# Patient Record
Sex: FEMALE | Race: WHITE | NOT HISPANIC OR LATINO
[De-identification: names, ages, dates, MRNs, and addresses within clinical notes are randomized per-mention and may not be internally consistent; named-entity substitution may affect disease eponyms.]

---

## 2018-05-12 ENCOUNTER — TRANSCRIPTION ENCOUNTER (OUTPATIENT)
Age: 63
End: 2018-05-12

## 2023-03-15 ENCOUNTER — OFFICE (OUTPATIENT)
Dept: URBAN - METROPOLITAN AREA CLINIC 97 | Facility: CLINIC | Age: 68
Setting detail: OPHTHALMOLOGY
End: 2023-03-15
Payer: COMMERCIAL

## 2023-03-15 DIAGNOSIS — H01.001: ICD-10-CM

## 2023-03-15 DIAGNOSIS — H01.005: ICD-10-CM

## 2023-03-15 DIAGNOSIS — H01.004: ICD-10-CM

## 2023-03-15 DIAGNOSIS — H40.033: ICD-10-CM

## 2023-03-15 DIAGNOSIS — H01.002: ICD-10-CM

## 2023-03-15 DIAGNOSIS — H25.13: ICD-10-CM

## 2023-03-15 DIAGNOSIS — D31.02: ICD-10-CM

## 2023-03-15 DIAGNOSIS — D31.01: ICD-10-CM

## 2023-03-15 PROCEDURE — 92014 COMPRE OPH EXAM EST PT 1/>: CPT | Performed by: OPHTHALMOLOGY

## 2023-03-15 PROCEDURE — 92020 GONIOSCOPY: CPT | Performed by: OPHTHALMOLOGY

## 2023-03-15 ASSESSMENT — REFRACTION_CURRENTRX
OS_CYLINDER: +0.25
OD_SPHERE: -3.50
OD_OVR_VA: 20/
OD_VPRISM_DIRECTION: PROGS
OS_ADD: +2.50
OS_VPRISM_DIRECTION: PROGS
OD_ADD: +2.50
OS_SPHERE: -3.00
OS_OVR_VA: 20/
OD_CYLINDER: +0.75
OS_AXIS: 143
OD_AXIS: 013

## 2023-03-15 ASSESSMENT — TONOMETRY
OS_IOP_MMHG: 15
OD_IOP_MMHG: 14

## 2023-03-15 ASSESSMENT — KERATOMETRY
OS_AXISANGLE_DEGREES: 084
OD_K2POWER_DIOPTERS: 46.25
OS_K1POWER_DIOPTERS: 45.50
OS_K2POWER_DIOPTERS: 46.25
OD_AXISANGLE_DEGREES: 072
OD_K1POWER_DIOPTERS: 46.00

## 2023-03-15 ASSESSMENT — REFRACTION_AUTOREFRACTION
OS_AXIS: 129
OD_AXIS: 022
OD_SPHERE: -3.75
OS_CYLINDER: +0.25
OS_SPHERE: -3.00
OD_CYLINDER: +0.75

## 2023-03-15 ASSESSMENT — AXIALLENGTH_DERIVED
OD_AL: 23.9479
OS_AL: 23.8423

## 2023-03-15 ASSESSMENT — SPHEQUIV_DERIVED
OS_SPHEQUIV: -2.875
OD_SPHEQUIV: -3.375

## 2023-03-15 ASSESSMENT — LID EXAM ASSESSMENTS
OS_BLEPHARITIS: LLL LUL 1+
OD_BLEPHARITIS: RLL RUL 1+

## 2023-03-15 ASSESSMENT — VISUAL ACUITY
OD_BCVA: 20/25-2
OS_BCVA: 20/20-2

## 2023-03-15 ASSESSMENT — CONFRONTATIONAL VISUAL FIELD TEST (CVF)
OS_FINDINGS: FULL
OD_FINDINGS: FULL

## 2023-07-23 ENCOUNTER — NON-APPOINTMENT (OUTPATIENT)
Age: 68
End: 2023-07-23

## 2023-08-02 ENCOUNTER — APPOINTMENT (OUTPATIENT)
Dept: ORTHOPEDIC SURGERY | Facility: CLINIC | Age: 68
End: 2023-08-02
Payer: MEDICARE

## 2023-08-02 ENCOUNTER — TRANSCRIPTION ENCOUNTER (OUTPATIENT)
Age: 68
End: 2023-08-02

## 2023-08-02 DIAGNOSIS — S90.31XA CONTUSION OF RIGHT FOOT, INITIAL ENCOUNTER: ICD-10-CM

## 2023-08-02 DIAGNOSIS — Z86.39 PERSONAL HISTORY OF OTHER ENDOCRINE, NUTRITIONAL AND METABOLIC DISEASE: ICD-10-CM

## 2023-08-02 DIAGNOSIS — Z86.59 PERSONAL HISTORY OF OTHER MENTAL AND BEHAVIORAL DISORDERS: ICD-10-CM

## 2023-08-02 DIAGNOSIS — Z86.79 PERSONAL HISTORY OF OTHER DISEASES OF THE CIRCULATORY SYSTEM: ICD-10-CM

## 2023-08-02 PROBLEM — Z00.00 ENCOUNTER FOR PREVENTIVE HEALTH EXAMINATION: Status: ACTIVE | Noted: 2023-08-02

## 2023-08-02 PROCEDURE — 73630 X-RAY EXAM OF FOOT: CPT | Mod: FY,RT

## 2023-08-02 PROCEDURE — 99213 OFFICE O/P EST LOW 20 MIN: CPT

## 2023-08-02 RX ORDER — SERTRALINE HYDROCHLORIDE 25 MG/1
TABLET, FILM COATED ORAL
Refills: 0 | Status: ACTIVE | COMMUNITY

## 2023-08-02 RX ORDER — LEVOTHYROXINE SODIUM 0.09 MG/1
88 TABLET ORAL
Refills: 0 | Status: ACTIVE | COMMUNITY

## 2023-08-02 NOTE — ASSESSMENT
[FreeTextEntry1] : I DISCUSSED TREATMENT OPTIONS AS FOLLOWS:  I DISCUSSED FINDINGS WITH MY PATIENT AND DISCUSSED FURTHER TREATMENT IF NEEDED.  THE FOLLOWING WAS SUGGESTED: NSAID OF CHOICE. TYLENOL. VOLTAREN GEL APPLY 4 TIMES PER DAY TO AFFECTED AREA.  WARM OR COLD COMPRESSES AS TOLERATED. PT. WAS DISCUSSED AS WELL AS HOME EXERCISE PROGRAMS. ELEVATE AND APPLY WARM COMPRESSES. OTC INSERTS TO BE WORN FROM POWER STEP OR LIKE INSERTS. . STEROID INJECTION DISCUSSED MRI for right foot to rule out fracture or lisfranc injury.

## 2023-08-02 NOTE — HISTORY OF PRESENT ILLNESS
[de-identified] : States she injured her RT foot doing a burpee DOI 07/21/23. States she went to urgent care, xrays obtained, was told no fractures. States front of her foot is causing her pain and soreness. Patient reports some swelling of the front of the foot.

## 2023-08-02 NOTE — PHYSICAL EXAM
[Mild] : mild swelling of dorsal foot [1st] : 1st [2nd] : 2nd [3rd] : 3rd [4th] : 4th [5th] : 5th [NL 30)] : inversion 30 degrees [NL (40)] : MTP joint DF 40 degrees [NL (20)] : MTP joint PF 20 degrees [5___] : [facility] 5[unfilled]/5 [2+] : posterior tibialis pulse: 2+ [Normal] : saphenous nerve sensation normal [] : mildly antalgic [Right] : right foot [There are no fractures, subluxations or dislocations. No significant abnormalities are seen] : There are no fractures, subluxations or dislocations. No significant abnormalities are seen

## 2023-08-04 ENCOUNTER — APPOINTMENT (OUTPATIENT)
Dept: ORTHOPEDIC SURGERY | Facility: CLINIC | Age: 68
End: 2023-08-04
Payer: MEDICARE

## 2023-08-04 PROCEDURE — L4361: CPT | Mod: RT,KX

## 2023-08-04 PROCEDURE — 99214 OFFICE O/P EST MOD 30 MIN: CPT | Mod: 57

## 2023-08-04 PROCEDURE — 28450 TX TARSAL B1 FX W/O MNPJ EA: CPT

## 2023-08-04 RX ORDER — MELOXICAM 7.5 MG/1
7.5 TABLET ORAL
Qty: 30 | Refills: 2 | Status: ACTIVE | COMMUNITY
Start: 2023-08-04 | End: 1900-01-01

## 2023-08-04 NOTE — HISTORY OF PRESENT ILLNESS
[de-identified] : Patient presents for MRI results. Tiffany received phone call from [facility] that there is a fx shown on the MRI on RT foot.

## 2023-08-04 NOTE — ASSESSMENT
[FreeTextEntry1] : ELEVATE LEG. APPLY COOL OR WARM COMPRESSES PENDING ON YOUR COMFORT. NSAID FOR PAIN OR TYLENOL WEAR BOOT AS DIRECTED WITH WALKING. CRUTCHES SHOULD BE USED IF INSTRUCTED BY DR DEL VALLE VITAMIN D 3 AND CALCIUM FOR AIDING IN HEALING.  CALL IF ANY ISSUES OR SEVERE PAIN.  ALL QUESTIONS WERE ANSWERED BY ME REGARDING THE PATIENTS TREATMENT HEALING SHOULD TAKE AT LEAST 6 WEEKS SURGERY MAY BE INDICATED IF HEALING DOES NOT OCCUR. CAM WALKER DISPENSED AND INSTRUCTIONS.  RTO 6 WEEKS

## 2023-08-04 NOTE — PHYSICAL EXAM
[Right] : right foot and ankle [2nd] : 2nd [3rd] : 3rd [5___] : [facility] 5[unfilled]/5 [2+] : posterior tibialis pulse: 2+ [] : mildly antalgic [FreeTextEntry8] : [employer] JOINT PAIN.

## 2023-09-11 ENCOUNTER — APPOINTMENT (OUTPATIENT)
Dept: ORTHOPEDIC SURGERY | Facility: CLINIC | Age: 68
End: 2023-09-11
Payer: MEDICARE

## 2023-09-11 DIAGNOSIS — S92.224A NONDISPLACED FRACTURE OF LATERAL CUNEIFORM OF RIGHT FOOT, INITIAL ENCOUNTER FOR CLOSED FRACTURE: ICD-10-CM

## 2023-09-11 PROCEDURE — 73630 X-RAY EXAM OF FOOT: CPT | Mod: RT

## 2023-09-11 PROCEDURE — 99024 POSTOP FOLLOW-UP VISIT: CPT

## 2024-05-19 ENCOUNTER — RX ONLY (RX ONLY)
Age: 69
End: 2024-05-19

## 2024-05-19 ENCOUNTER — OFFICE (OUTPATIENT)
Dept: URBAN - METROPOLITAN AREA CLINIC 38 | Facility: CLINIC | Age: 69
Setting detail: OPHTHALMOLOGY
End: 2024-05-19
Payer: MEDICARE

## 2024-05-19 DIAGNOSIS — H16.221: ICD-10-CM

## 2024-05-19 PROBLEM — D31.0 NEVUS,CONJUNCTIVAL; RIGHT EYE, LEFT EYE: Status: ACTIVE | Noted: 2024-05-19

## 2024-05-19 PROCEDURE — 99213 OFFICE O/P EST LOW 20 MIN: CPT | Performed by: OPHTHALMOLOGY

## 2024-05-19 ASSESSMENT — LID EXAM ASSESSMENTS
OS_MEIBOMITIS: LUL 1+
OD_BLEPHARITIS: RLL RUL 1+
OS_BLEPHARITIS: LLL LUL 1+
OD_MEIBOMITIS: RUL 1+

## 2024-05-19 ASSESSMENT — CONFRONTATIONAL VISUAL FIELD TEST (CVF)
OS_FINDINGS: FULL
OD_FINDINGS: FULL

## 2024-06-07 ENCOUNTER — OFFICE (OUTPATIENT)
Dept: URBAN - METROPOLITAN AREA CLINIC 38 | Facility: CLINIC | Age: 69
Setting detail: OPHTHALMOLOGY
End: 2024-06-07
Payer: MEDICARE

## 2024-06-07 ENCOUNTER — RX ONLY (RX ONLY)
Age: 69
End: 2024-06-07

## 2024-06-07 DIAGNOSIS — H16.223: ICD-10-CM

## 2024-06-07 DIAGNOSIS — H01.004: ICD-10-CM

## 2024-06-07 DIAGNOSIS — H01.001: ICD-10-CM

## 2024-06-07 PROBLEM — D31.02 NEVUS,CONJUNCTIVAL; RIGHT EYE, LEFT EYE: Status: ACTIVE | Noted: 2024-06-07

## 2024-06-07 PROBLEM — D31.01 NEVUS,CONJUNCTIVAL; RIGHT EYE, LEFT EYE: Status: ACTIVE | Noted: 2024-06-07

## 2024-06-07 PROCEDURE — 99213 OFFICE O/P EST LOW 20 MIN: CPT

## 2024-06-07 ASSESSMENT — LID EXAM ASSESSMENTS
OS_MEIBOMITIS: LUL 1+
OS_BLEPHARITIS: LLL LUL 1+
OD_MEIBOMITIS: RUL 1+
OD_BLEPHARITIS: RLL RUL 1+

## 2024-06-07 ASSESSMENT — CONFRONTATIONAL VISUAL FIELD TEST (CVF)
OD_FINDINGS: FULL
OS_FINDINGS: FULL

## 2025-01-07 ENCOUNTER — OFFICE (OUTPATIENT)
Dept: URBAN - METROPOLITAN AREA CLINIC 100 | Facility: CLINIC | Age: 70
Setting detail: OPHTHALMOLOGY
End: 2025-01-07

## 2025-01-07 PROCEDURE — SCCOS COSMETIC CONSULTATION: Performed by: OPHTHALMOLOGY

## 2025-01-07 ASSESSMENT — DRY EYES - PHYSICIAN NOTES
OD_GENERALCOMMENTS: INFERIOR
OS_GENERALCOMMENTS: INFERIOR

## 2025-01-07 ASSESSMENT — LID EXAM ASSESSMENTS
OD_BLEPHARITIS: RLL RUL 1+
OS_BLEPHARITIS: LLL LUL 1+
OS_MEIBOMITIS: LUL 1+
OD_MEIBOMITIS: RUL 1+

## 2025-01-07 ASSESSMENT — SUPERFICIAL PUNCTATE KERATITIS (SPK)
OS_SPK: 2+
OD_SPK: 2+

## 2025-01-08 ASSESSMENT — KERATOMETRY
OD_K2POWER_DIOPTERS: 46.25
OS_AXISANGLE_DEGREES: 084
OD_K1POWER_DIOPTERS: 46.00
OS_K1POWER_DIOPTERS: 45.50
OS_K2POWER_DIOPTERS: 46.25
OD_AXISANGLE_DEGREES: 072

## 2025-01-08 ASSESSMENT — REFRACTION_AUTOREFRACTION
OS_AXIS: 129
OD_CYLINDER: +0.75
OS_SPHERE: -3.00
OS_CYLINDER: +0.25
OD_AXIS: 022
OD_SPHERE: -3.75

## 2025-01-08 ASSESSMENT — VISUAL ACUITY
OD_BCVA: 20/30-1
OS_BCVA: 20/25-2

## 2025-02-26 ENCOUNTER — APPOINTMENT (OUTPATIENT)
Dept: CARDIOLOGY | Facility: CLINIC | Age: 70
End: 2025-02-26
Payer: MEDICARE

## 2025-02-26 DIAGNOSIS — E78.5 HYPERLIPIDEMIA, UNSPECIFIED: ICD-10-CM

## 2025-02-26 DIAGNOSIS — Z80.51 FAMILY HISTORY OF MALIGNANT NEOPLASM OF KIDNEY: ICD-10-CM

## 2025-02-26 DIAGNOSIS — Z83.3 FAMILY HISTORY OF DIABETES MELLITUS: ICD-10-CM

## 2025-02-26 DIAGNOSIS — Z87.42 PERSONAL HISTORY OF OTHER DISEASES OF THE FEMALE GENITAL TRACT: ICD-10-CM

## 2025-02-26 DIAGNOSIS — Z80.1 FAMILY HISTORY OF MALIGNANT NEOPLASM OF TRACHEA, BRONCHUS AND LUNG: ICD-10-CM

## 2025-02-26 PROCEDURE — 99204 OFFICE O/P NEW MOD 45 MIN: CPT

## 2025-02-26 PROCEDURE — 93000 ELECTROCARDIOGRAM COMPLETE: CPT

## 2025-02-26 RX ORDER — MELOXICAM 15 MG/1
15 TABLET ORAL
Qty: 30 | Refills: 1 | Status: DISCONTINUED | COMMUNITY
Start: 2025-02-26 | End: 2025-02-26

## 2025-02-26 RX ORDER — SERTRALINE 25 MG/1
25 TABLET, FILM COATED ORAL DAILY
Refills: 0 | Status: ACTIVE | COMMUNITY
Start: 2025-02-26

## 2025-02-26 RX ORDER — CHLORHEXIDINE GLUCONATE 4 %
600 LIQUID (ML) TOPICAL
Refills: 0 | Status: ACTIVE | COMMUNITY
Start: 2025-02-26

## 2025-02-26 RX ORDER — MINOXIDIL 2.5 MG/1
2.5 TABLET ORAL DAILY
Refills: 0 | Status: ACTIVE | COMMUNITY
Start: 2025-02-26

## 2025-02-26 RX ORDER — MULTIVIT WITH MIN/ALA/HERBS 50 MG
TABLET ORAL DAILY
Refills: 0 | Status: ACTIVE | COMMUNITY

## 2025-02-26 RX ORDER — PSEUDOEPHEDRINE HCL 30 MG
500-10-250 TABLET ORAL DAILY
Refills: 0 | Status: DISCONTINUED | COMMUNITY
Start: 2025-02-26 | End: 2025-02-26

## 2025-02-26 RX ORDER — LEVOTHYROXINE SODIUM 88 UG/1
88 CAPSULE ORAL DAILY
Refills: 0 | Status: ACTIVE | COMMUNITY
Start: 2025-02-26

## 2025-02-26 RX ORDER — CLOBETASOL PROPIONATE 0.05 G/100ML
0.05 SHAMPOO TOPICAL DAILY
Refills: 0 | Status: ACTIVE | COMMUNITY
Start: 2025-02-26

## 2025-02-28 RX ORDER — LOVASTATIN 20 MG/1
20 TABLET ORAL
Qty: 60 | Refills: 2 | Status: ACTIVE | COMMUNITY
Start: 2025-02-28 | End: 1900-01-01

## 2025-04-07 ENCOUNTER — OFFICE (OUTPATIENT)
Dept: URBAN - METROPOLITAN AREA CLINIC 97 | Facility: CLINIC | Age: 70
Setting detail: OPHTHALMOLOGY
End: 2025-04-07
Payer: MEDICARE

## 2025-04-07 ENCOUNTER — RX ONLY (RX ONLY)
Age: 70
End: 2025-04-07

## 2025-04-07 DIAGNOSIS — H01.001: ICD-10-CM

## 2025-04-07 DIAGNOSIS — H01.005: ICD-10-CM

## 2025-04-07 DIAGNOSIS — H01.004: ICD-10-CM

## 2025-04-07 DIAGNOSIS — H01.002: ICD-10-CM

## 2025-04-07 PROCEDURE — 99213 OFFICE O/P EST LOW 20 MIN: CPT | Performed by: OPTOMETRIST

## 2025-04-07 ASSESSMENT — KERATOMETRY
OD_AXISANGLE_DEGREES: 072
OS_AXISANGLE_DEGREES: 084
OD_K2POWER_DIOPTERS: 46.25
OD_K1POWER_DIOPTERS: 46.00
OS_K2POWER_DIOPTERS: 46.25
OS_K1POWER_DIOPTERS: 45.50

## 2025-04-07 ASSESSMENT — LID EXAM ASSESSMENTS
OS_BLEPHARITIS: LLL LUL 2+
OS_MEIBOMITIS: LUL 1+
OD_COMMENTS: 2+ COLLARETTES AND ERYTHEMA
OD_MEIBOMITIS: RUL 1+
OD_BLEPHARITIS: RLL RUL 2+
OS_COMMENTS: 2+ COLLARETTES AND ERYTHEMA

## 2025-04-07 ASSESSMENT — SUPERFICIAL PUNCTATE KERATITIS (SPK)
OS_SPK: 2+
OD_SPK: 2+

## 2025-04-07 ASSESSMENT — REFRACTION_AUTOREFRACTION
OD_AXIS: 022
OD_CYLINDER: +0.75
OS_CYLINDER: +0.25
OD_SPHERE: -3.75
OS_SPHERE: -3.00
OS_AXIS: 129

## 2025-04-07 ASSESSMENT — VISUAL ACUITY
OS_BCVA: 20/25
OD_BCVA: 20/25

## 2025-04-07 ASSESSMENT — CONFRONTATIONAL VISUAL FIELD TEST (CVF)
OD_FINDINGS: FULL
OS_FINDINGS: FULL

## 2025-04-07 ASSESSMENT — DRY EYES - PHYSICIAN NOTES
OD_GENERALCOMMENTS: INFERIOR
OS_GENERALCOMMENTS: INFERIOR

## 2025-04-08 ENCOUNTER — RX ONLY (RX ONLY)
Age: 70
End: 2025-04-08

## 2025-04-08 ENCOUNTER — OFFICE (OUTPATIENT)
Dept: URBAN - METROPOLITAN AREA CLINIC 38 | Facility: CLINIC | Age: 70
Setting detail: OPHTHALMOLOGY
End: 2025-04-08
Payer: MEDICARE

## 2025-04-08 DIAGNOSIS — H02.831: ICD-10-CM

## 2025-04-08 DIAGNOSIS — H02.834: ICD-10-CM

## 2025-04-08 DIAGNOSIS — H00.15: ICD-10-CM

## 2025-04-08 DIAGNOSIS — H53.40: ICD-10-CM

## 2025-04-08 PROCEDURE — SCCOS COSMETIC CONSULTATION: Performed by: STUDENT IN AN ORGANIZED HEALTH CARE EDUCATION/TRAINING PROGRAM

## 2025-04-08 PROCEDURE — 92285 EXTERNAL OCULAR PHOTOGRAPHY: CPT | Performed by: STUDENT IN AN ORGANIZED HEALTH CARE EDUCATION/TRAINING PROGRAM

## 2025-04-08 PROCEDURE — 99214 OFFICE O/P EST MOD 30 MIN: CPT | Performed by: STUDENT IN AN ORGANIZED HEALTH CARE EDUCATION/TRAINING PROGRAM

## 2025-04-08 PROCEDURE — 92082 INTERMEDIATE VISUAL FIELD XM: CPT | Performed by: STUDENT IN AN ORGANIZED HEALTH CARE EDUCATION/TRAINING PROGRAM

## 2025-04-08 ASSESSMENT — KERATOMETRY
OS_AXISANGLE_DEGREES: 084
OD_AXISANGLE_DEGREES: 072
OS_K2POWER_DIOPTERS: 46.25
OD_K2POWER_DIOPTERS: 46.25
OD_K1POWER_DIOPTERS: 46.00
OS_K1POWER_DIOPTERS: 45.50

## 2025-04-08 ASSESSMENT — VISUAL ACUITY
OD_BCVA: 20/25
OS_BCVA: 20/25

## 2025-04-08 ASSESSMENT — DRY EYES - PHYSICIAN NOTES
OD_GENERALCOMMENTS: INFERIOR
OS_GENERALCOMMENTS: INFERIOR

## 2025-04-08 ASSESSMENT — REFRACTION_AUTOREFRACTION
OS_CYLINDER: +0.25
OS_SPHERE: -3.00
OD_AXIS: 022
OS_AXIS: 129
OD_CYLINDER: +0.75
OD_SPHERE: -3.75

## 2025-04-08 ASSESSMENT — SUPERFICIAL PUNCTATE KERATITIS (SPK)
OD_SPK: 2+
OS_SPK: 2+

## 2025-04-08 ASSESSMENT — CONFRONTATIONAL VISUAL FIELD TEST (CVF)
OS_FINDINGS: FULL
OD_FINDINGS: FULL

## 2025-04-18 ENCOUNTER — NON-APPOINTMENT (OUTPATIENT)
Age: 70
End: 2025-04-18

## 2025-04-23 ENCOUNTER — APPOINTMENT (OUTPATIENT)
Dept: CARDIOLOGY | Facility: CLINIC | Age: 70
End: 2025-04-23
Payer: MEDICARE

## 2025-04-23 PROCEDURE — 93015 CV STRESS TEST SUPVJ I&R: CPT

## 2025-04-29 ENCOUNTER — APPOINTMENT (OUTPATIENT)
Dept: CARDIOLOGY | Facility: CLINIC | Age: 70
End: 2025-04-29
Payer: MEDICARE

## 2025-04-29 VITALS
HEART RATE: 83 BPM | HEIGHT: 63 IN | SYSTOLIC BLOOD PRESSURE: 120 MMHG | DIASTOLIC BLOOD PRESSURE: 68 MMHG | OXYGEN SATURATION: 94 % | BODY MASS INDEX: 26.22 KG/M2 | WEIGHT: 148 LBS

## 2025-04-29 DIAGNOSIS — E78.5 HYPERLIPIDEMIA, UNSPECIFIED: ICD-10-CM

## 2025-04-29 PROCEDURE — 99213 OFFICE O/P EST LOW 20 MIN: CPT

## 2025-04-29 RX ORDER — ASCORBIC ACID 1000 MG
10 TABLET ORAL
Refills: 0 | Status: ACTIVE | COMMUNITY

## 2025-04-30 ENCOUNTER — OFFICE (OUTPATIENT)
Dept: URBAN - METROPOLITAN AREA CLINIC 38 | Facility: CLINIC | Age: 70
Setting detail: OPHTHALMOLOGY
End: 2025-04-30
Payer: MEDICARE

## 2025-04-30 DIAGNOSIS — H02.831: ICD-10-CM

## 2025-04-30 DIAGNOSIS — H02.834: ICD-10-CM

## 2025-04-30 PROBLEM — H01.002 BLEPHARITIS; RIGHT UPPER LID, LEFT UPPER LID , RIGHT LOWER LID, LEFT LOWER LID: Status: ACTIVE | Noted: 2025-04-30

## 2025-04-30 PROBLEM — H00.15 CHALAZION; LEFT LOWER LID: Status: ACTIVE | Noted: 2025-04-07

## 2025-04-30 PROBLEM — H01.005 BLEPHARITIS; RIGHT UPPER LID, LEFT UPPER LID , RIGHT LOWER LID, LEFT LOWER LID: Status: ACTIVE | Noted: 2025-04-30

## 2025-04-30 PROBLEM — H53.40 VISUAL FIELD DEFECT: Status: ACTIVE | Noted: 2025-04-08

## 2025-04-30 PROBLEM — B88.0 ACARIASIS, INFESTATION OF MITES AND OR TICS ; BOTH EYES: Status: ACTIVE | Noted: 2025-04-07

## 2025-04-30 PROBLEM — H01.004 BLEPHARITIS; RIGHT UPPER LID, LEFT UPPER LID , RIGHT LOWER LID, LEFT LOWER LID: Status: ACTIVE | Noted: 2025-04-30

## 2025-04-30 PROBLEM — H01.001 BLEPHARITIS; RIGHT UPPER LID, LEFT UPPER LID , RIGHT LOWER LID, LEFT LOWER LID: Status: ACTIVE | Noted: 2025-04-30

## 2025-04-30 PROCEDURE — 15823 BLEPHARP UPR EYELID XCSV SKN: CPT | Mod: 50 | Performed by: STUDENT IN AN ORGANIZED HEALTH CARE EDUCATION/TRAINING PROGRAM

## 2025-04-30 ASSESSMENT — CONFRONTATIONAL VISUAL FIELD TEST (CVF)
OS_FINDINGS: FULL
OD_FINDINGS: FULL

## 2025-05-01 ENCOUNTER — RX ONLY (RX ONLY)
Age: 70
End: 2025-05-01

## 2025-05-01 RX ORDER — EZETIMIBE 10 MG/1
10 TABLET ORAL
Qty: 30 | Refills: 1 | Status: DISCONTINUED | COMMUNITY
Start: 2025-04-29 | End: 2025-05-01

## 2025-05-01 RX ORDER — ROSUVASTATIN CALCIUM 10 MG/1
10 TABLET, FILM COATED ORAL EVERY OTHER DAY
Qty: 45 | Refills: 3 | Status: ACTIVE | COMMUNITY
Start: 2025-05-01 | End: 1900-01-01

## 2025-05-01 ASSESSMENT — KERATOMETRY
OS_K2POWER_DIOPTERS: 46.25
OD_AXISANGLE_DEGREES: 072
OS_AXISANGLE_DEGREES: 084
OD_K1POWER_DIOPTERS: 46.00
OD_K2POWER_DIOPTERS: 46.25
OS_K1POWER_DIOPTERS: 45.50

## 2025-05-01 ASSESSMENT — VISUAL ACUITY
OS_BCVA: 20/25
OD_BCVA: 20/25

## 2025-05-07 ENCOUNTER — RX ONLY (RX ONLY)
Age: 70
End: 2025-05-07

## 2025-05-07 ENCOUNTER — OFFICE (OUTPATIENT)
Dept: URBAN - METROPOLITAN AREA CLINIC 38 | Facility: CLINIC | Age: 70
Setting detail: OPHTHALMOLOGY
End: 2025-05-07
Payer: MEDICARE

## 2025-05-07 DIAGNOSIS — H02.831: ICD-10-CM

## 2025-05-07 DIAGNOSIS — H00.15: ICD-10-CM

## 2025-05-07 DIAGNOSIS — H02.834: ICD-10-CM

## 2025-05-07 PROBLEM — H01.005 BLEPHARITIS; RIGHT UPPER LID, LEFT UPPER LID , RIGHT LOWER LID, LEFT LOWER LID: Status: ACTIVE | Noted: 2025-05-07

## 2025-05-07 PROBLEM — H01.004 BLEPHARITIS; RIGHT UPPER LID, LEFT UPPER LID , RIGHT LOWER LID, LEFT LOWER LID: Status: ACTIVE | Noted: 2025-05-07

## 2025-05-07 PROBLEM — H01.002 BLEPHARITIS; RIGHT UPPER LID, LEFT UPPER LID , RIGHT LOWER LID, LEFT LOWER LID: Status: ACTIVE | Noted: 2025-05-07

## 2025-05-07 PROBLEM — H01.001 BLEPHARITIS; RIGHT UPPER LID, LEFT UPPER LID , RIGHT LOWER LID, LEFT LOWER LID: Status: ACTIVE | Noted: 2025-05-07

## 2025-05-07 PROCEDURE — 99024 POSTOP FOLLOW-UP VISIT: CPT | Performed by: STUDENT IN AN ORGANIZED HEALTH CARE EDUCATION/TRAINING PROGRAM

## 2025-05-07 ASSESSMENT — REFRACTION_AUTOREFRACTION
OS_AXIS: 129
OD_SPHERE: -3.75
OS_SPHERE: -3.00
OD_CYLINDER: +0.75
OD_AXIS: 022
OS_CYLINDER: +0.25

## 2025-05-07 ASSESSMENT — SUPERFICIAL PUNCTATE KERATITIS (SPK)
OD_SPK: 2+
OS_SPK: 2+

## 2025-05-07 ASSESSMENT — KERATOMETRY
OS_AXISANGLE_DEGREES: 084
OS_K2POWER_DIOPTERS: 46.25
OD_K2POWER_DIOPTERS: 46.25
OD_K1POWER_DIOPTERS: 46.00
OS_K1POWER_DIOPTERS: 45.50
OD_AXISANGLE_DEGREES: 072

## 2025-05-07 ASSESSMENT — LID POSITION - DERMATOCHALASIS
OS_DERMATOCHALASIS: ABSENT
OD_DERMATOCHALASIS: ABSENT

## 2025-05-07 ASSESSMENT — CONFRONTATIONAL VISUAL FIELD TEST (CVF)
OD_FINDINGS: FULL
OS_FINDINGS: FULL

## 2025-05-07 ASSESSMENT — DRY EYES - PHYSICIAN NOTES
OS_GENERALCOMMENTS: INFERIOR
OD_GENERALCOMMENTS: INFERIOR

## 2025-05-07 ASSESSMENT — VISUAL ACUITY
OS_BCVA: 20/20-1
OD_BCVA: 20/25

## 2025-07-09 ENCOUNTER — OFFICE (OUTPATIENT)
Dept: URBAN - METROPOLITAN AREA CLINIC 38 | Facility: CLINIC | Age: 70
Setting detail: OPHTHALMOLOGY
End: 2025-07-09
Payer: MEDICARE

## 2025-07-09 DIAGNOSIS — H02.834: ICD-10-CM

## 2025-07-09 DIAGNOSIS — H02.831: ICD-10-CM

## 2025-07-09 PROBLEM — H01.002 BLEPHARITIS; RIGHT UPPER LID, LEFT UPPER LID , RIGHT LOWER LID, LEFT LOWER LID: Status: ACTIVE | Noted: 2025-07-09

## 2025-07-09 PROBLEM — H01.005 BLEPHARITIS; RIGHT UPPER LID, LEFT UPPER LID , RIGHT LOWER LID, LEFT LOWER LID: Status: ACTIVE | Noted: 2025-07-09

## 2025-07-09 PROBLEM — H01.004 BLEPHARITIS; RIGHT UPPER LID, LEFT UPPER LID , RIGHT LOWER LID, LEFT LOWER LID: Status: ACTIVE | Noted: 2025-07-09

## 2025-07-09 PROBLEM — H01.001 BLEPHARITIS; RIGHT UPPER LID, LEFT UPPER LID , RIGHT LOWER LID, LEFT LOWER LID: Status: ACTIVE | Noted: 2025-07-09

## 2025-07-09 PROCEDURE — 99024 POSTOP FOLLOW-UP VISIT: CPT | Performed by: STUDENT IN AN ORGANIZED HEALTH CARE EDUCATION/TRAINING PROGRAM

## 2025-07-09 ASSESSMENT — CONFRONTATIONAL VISUAL FIELD TEST (CVF)
OS_FINDINGS: FULL
OD_FINDINGS: FULL

## 2025-07-09 ASSESSMENT — KERATOMETRY
OD_K1POWER_DIOPTERS: 46.00
OS_AXISANGLE_DEGREES: 084
OD_K2POWER_DIOPTERS: 46.25
OD_AXISANGLE_DEGREES: 072
OS_K1POWER_DIOPTERS: 45.50
OS_K2POWER_DIOPTERS: 46.25

## 2025-07-09 ASSESSMENT — REFRACTION_AUTOREFRACTION
OD_SPHERE: -3.75
OD_CYLINDER: +0.75
OS_SPHERE: -3.00
OS_CYLINDER: +0.25
OS_AXIS: 129
OD_AXIS: 022

## 2025-07-09 ASSESSMENT — VISUAL ACUITY
OS_BCVA: 20/25-2
OD_BCVA: 20/25-2

## 2025-07-09 ASSESSMENT — LID POSITION - DERMATOCHALASIS
OD_DERMATOCHALASIS: ABSENT
OS_DERMATOCHALASIS: ABSENT

## 2025-07-09 ASSESSMENT — DRY EYES - PHYSICIAN NOTES
OS_GENERALCOMMENTS: INFERIOR
OD_GENERALCOMMENTS: INFERIOR

## 2025-07-09 ASSESSMENT — SUPERFICIAL PUNCTATE KERATITIS (SPK)
OS_SPK: 2+
OD_SPK: 2+

## 2025-08-20 ENCOUNTER — APPOINTMENT (OUTPATIENT)
Dept: CARDIOLOGY | Facility: CLINIC | Age: 70
End: 2025-08-20